# Patient Record
Sex: FEMALE | Race: WHITE | Employment: OTHER | URBAN - METROPOLITAN AREA
[De-identification: names, ages, dates, MRNs, and addresses within clinical notes are randomized per-mention and may not be internally consistent; named-entity substitution may affect disease eponyms.]

---

## 2020-12-18 RX ORDER — SODIUM CHLORIDE 0.9 % (FLUSH) 0.9 %
5-40 SYRINGE (ML) INJECTION AS NEEDED
Status: CANCELLED | OUTPATIENT
Start: 2020-12-18

## 2020-12-18 RX ORDER — SODIUM CHLORIDE 0.9 % (FLUSH) 0.9 %
5-40 SYRINGE (ML) INJECTION EVERY 8 HOURS
Status: CANCELLED | OUTPATIENT
Start: 2020-12-18

## 2020-12-18 RX ORDER — DIPHENHYDRAMINE HYDROCHLORIDE 50 MG/ML
50 INJECTION, SOLUTION INTRAMUSCULAR; INTRAVENOUS ONCE
Status: CANCELLED | OUTPATIENT
Start: 2020-12-18 | End: 2020-12-18

## 2020-12-18 RX ORDER — EPINEPHRINE 0.1 MG/ML
1 INJECTION INTRACARDIAC; INTRAVENOUS
Status: CANCELLED | OUTPATIENT
Start: 2020-12-18 | End: 2020-12-19

## 2020-12-18 RX ORDER — ATROPINE SULFATE 0.1 MG/ML
0.5 INJECTION INTRAVENOUS
Status: CANCELLED | OUTPATIENT
Start: 2020-12-18 | End: 2020-12-19

## 2020-12-18 RX ORDER — DEXTROMETHORPHAN/PSEUDOEPHED 2.5-7.5/.8
1.2 DROPS ORAL
Status: CANCELLED | OUTPATIENT
Start: 2020-12-18

## 2020-12-23 ENCOUNTER — HOSPITAL ENCOUNTER (OUTPATIENT)
Dept: PREADMISSION TESTING | Age: 60
Discharge: HOME | End: 2020-12-23

## 2020-12-23 PROCEDURE — 87635 SARS-COV-2 COVID-19 AMP PRB: CPT

## 2020-12-24 LAB — SARS-COV-2, COV2NT: NOT DETECTED

## 2020-12-28 NOTE — H&P
Assessment/Plan  # Detail Type Description    1. Assessment Abdominal pain (R10.9). Patient Plan Colonoscopy ordered with Dr. Keaton Hermosillo, with Suprep bowel prep. Stressed importance of following all bowel preparation instructions. Explained the procedure to the patient including all risks and benefits. These risks consist of missed lesions on exam, bleeding, and bowel perforation with possible need for admission to the hospital, and in the most extensive of  circumstances, the patient may require surgery. Pt verbalized understanding of these risks and is agreeable with this procedure. Plan Orders Further diagnostic evaluations ordered today include(s) DIAGNOSTIC COLONOSCOPY to be performed. 2. Assessment Change in bowel habit (R19.4). Patient Plan Instructed patient to take Miralax daily to increase water content in stools. Encouraged pt to drink more water, walk as often as possible, eat fresh fruits and vegetables. Pt not to neglect eating cooked vegetables, and smoothies as well. Instructed pt to retrain body to attempt BM every morning, do not hold it, instead try to have a bowel movement when sensation is present. Avoid taking Benefiber unless bowels are moving consistently at regular frequency, as this can add to constipation if taken when already backed up. 3. Assessment Diverticular disease of colon (K57.30). Patient Plan Moderate tortuosity of sigmoid colon  Internal hemorrhoids. Moderate Diverticulosis in sigmoid, hepatic flexure, and cecum. This 56year old  patient was referred by Anthony Rodriguez. This 56year old female presents for Abdominal Pain. History of Present Illness:  1. Abdominal Pain   Onset: 6 months ago. Severity is severe. It occurs daily. The problem is resolved. Location is LLQ. There is no radiation. The patient describes it as sharp. Context: no pattern noted. Denies aggravating factors. Denies relieving factors. Associated symptoms include bloating, constipation, diarrhea and reflux. Pertinent negatives include back pain, blood in stool, change in appetite, diaphoresis, dizziness, dyspnea, eructation, fatigue, fever, flank pain, flatulence, flushing, heartburn, hematuria, jaundice, lightheadedness, menstruation, myalgia, nausea, rash, vaginal bleeding, vaginal discharge, vomiting, weight gain and weight loss. Additional information: BM: 1/day. Pt states the abdominal pain started 10/20 and resolved by 10/31. She is eating less and making healthier food choices, as she is trying to lose weight. Constipation seemed to be related to increase intake of bananas. She takes magnesium supplements which give her diarrhea. She also states she was eating a lot of [place] stew with corn, and may have contributed to recent diverticulitis flare up that she was recently treated with antibiotics for by Dr. Deann Simmons. Pt was referred to GI by Dr. Deann Simmons for the abdominal pain. Pt states, Dr. Deann Simmons told her she may need to go ahead and have her 10 year repeat colonoscopy due to the pain and recent diverticulitis flare.     Last Colonoscopy: 2/14/2011 Dr. Leslie Velasquez @[facility]  Moderate tortuosity of sigmoid, hepatic flexure, and cecum  No polyps, 10 year Recall      PROBLEM LIST:     Problem Description Onset Date Chronic Clinical Status Notes   Pure hypercholesterolemia 11/17/2010 Y     Attention deficit hyperactivity disorder 11/17/2010 Y     Depressive disorder 11/17/2010 Y     Non-toxic multinodular goiter 01/22/2013 Y     Thyroiditis 12/14/2012 Y               PAST MEDICAL/SURGICAL HISTORY   (Detailed)    Disease/disorder Onset Date Management Date Comments   Diverticular disease 2011 2008 Hysterectomy     surgery on left wrist 1990        Arthroscopy knee     Hashimoto's thyroiditis       Depression       Anxiety       Headache, migraine       constipation       Allergic Rhinitis       Insomnia       HLD - Hyperlipidemia OA - Osteoarthritis       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 Preferred language is [place]. The patient does not need an . EDUCATION/EMPLOYMENT/OCCUPATION  Employment History Status Retired Restrictions     own business        own business        MARITAL STATUS/FAMILY/SOCIAL SUPPORT  Marital status:    CHILDREN  Does not have children. Smoking status: Never smoker. TOBACCO SCREENING:  Patient has never used tobacco. Patient has not used tobacco in the last 30 days. Patient has not used smokeless tobacco in the last 30 days. SMOKING STATUS  Type Smoking Status Usage Per Day Years Used Pack Years Total Pack Years    Never smoker         TOBACCO/VAPING EXPOSURE  No passive smoke exposure. ALCOHOL  There is a history of alcohol use. Type: Beer. 2 glasses consumed rarely. CAFFEINE  The patient uses caffeine: soda. LIFESTYLE  Moderate activity level. Health club member. Exercise includes weights. Exercises 3-4 times a week. SLEEP PATTERNS  Patient has no changes to sleep patterns.               Medications (active prior to today)  Medication Name Sig Description Start Date Stop Date Refilled Rx Elsewhere   Multiple Vitamins tablet take 1 tablet by oral route  every day with food 2010   N   Motrin  mg tablet Take 200 mg by mouth every 6 (six) hours as needed for mild pain (PSR 1-3). //   Y   pravastatin 20 mg tablet take 1 tablet by oral route  every day 04/13/2020 04/13/2020 N   Pepcid AC 20 mg tablet take 1 tablet by oral route 2 times every day 07/01/2020 07/01/2020 N   Co Q-10 200 mg capsule  // 12/08/2020  Y   Wellbutrin  mg 24 hr tablet, extended release take 1 tablet by oral route  every day 12/07/2020   N   Celexa 40 mg tablet TAKE ONE TABLET BY MOUTH EVERY DAY 12/07/2020 12/07/2020 N   clobetasol 0.05 % topical cream apply by topical route 2 times every day a thin layer to the affected area(s) for 1 month then once a day for 1 month 12/07/2020 12/07/2020 N   Maxalt-MLT 10 mg disintegrating tablet place 1 tablet by translingual route on top of tongue, allow to dissolve, then swallow once, may repeat every 2 hrs; max 30 mg/24hrs on top of tongue, allow to dissolve then swallow once, may repeat every 2 hrs; max 30 mg/24hrs 12/07/2020 06/07/2021 12/07/2020 N   cyclobenzaprine 10 mg tablet take 1 tablet by oral route up to 2 times every day prn muscle spasms 12/07/2020 03/12/2021 12/07/2020 N   lorazepam 0.5 mg tablet take 0.5 - 1 tablet by ORAL route up to once daily as needed 12/07/2020 03/12/2021 12/07/2020 N     Patient Status   Completed with information received for patient in a summary of care record. Medication Reconciliation  Medications reconciled today. Medication Reviewed  Adherence Medication Name Sig Desc Elsewhere Status   taking as directed Motrin  mg tablet Take 200 mg by mouth every 6 (six) hours as needed for mild pain (PSR 1-3).  Y Verified   taking as directed Multiple Vitamins tablet take 1 tablet by oral route  every day with food N Verified   taking as directed pravastatin 20 mg tablet take 1 tablet by oral route  every day N Verified   taking as directed Pepcid AC 20 mg tablet take 1 tablet by oral route 2 times every day N Verified   taking as directed Wellbutrin  mg 24 hr tablet, extended release take 1 tablet by oral route  every day N Verified   taking as directed Celexa 40 mg tablet TAKE ONE TABLET BY MOUTH EVERY DAY N Verified   taking as directed clobetasol 0.05 % topical cream apply by topical route 2 times every day a thin layer to the affected area(s) for 1 month then once a day for 1 month N Verified   taking as directed Maxalt-MLT 10 mg disintegrating tablet place 1 tablet by translingual route on top of tongue, allow to dissolve, then swallow once, may repeat every 2 hrs; max 30 mg/24hrs on top of tongue, allow to dissolve then swallow once, may repeat every 2 hrs; max 30 mg/24hrs N Verified   taking as directed cyclobenzaprine 10 mg tablet take 1 tablet by oral route up to 2 times every day prn muscle spasms N Verified   taking as directed lorazepam 0.5 mg tablet take 0.5 - 1 tablet by ORAL route up to once daily as needed N Verified   taking as directed Suprep Bowel Prep Kit 17.5 gram-3.13 gram-1.6 gram oral solution Take as directed.  N Verified     Medications (Added, Continued or Stopped today)  Start Date Medication Directions PRN Status PRN Reason Instruction Stop Date   12/07/2020 Celexa 40 mg tablet TAKE ONE TABLET BY MOUTH EVERY DAY N      12/07/2020 clobetasol 0.05 % topical cream apply by topical route 2 times every day a thin layer to the affected area(s) for 1 month then once a day for 1 month N       Co Q-10 200 mg capsule  N   12/08/2020 12/07/2020 cyclobenzaprine 10 mg tablet take 1 tablet by oral route up to 2 times every day prn muscle spasms N   03/12/2021 12/07/2020 lorazepam 0.5 mg tablet take 0.5 - 1 tablet by ORAL route up to once daily as needed N   03/12/2021 12/07/2020 Maxalt-MLT 10 mg disintegrating tablet place 1 tablet by translingual route on top of tongue, allow to dissolve, then swallow once, may repeat every 2 hrs; max 30 mg/24hrs on top of tongue, allow to dissolve then swallow once, may repeat every 2 hrs; max 30 mg/24hrs N   06/07/2021    Motrin  mg tablet Take 200 mg by mouth every 6 (six) hours as needed for mild pain (PSR 1-3). N      01/22/2010 Multiple Vitamins tablet take 1 tablet by oral route  every day with food N      07/01/2020 Pepcid AC 20 mg tablet take 1 tablet by oral route 2 times every day N      04/13/2020 pravastatin 20 mg tablet take 1 tablet by oral route  every day N      12/08/2020 Suprep Bowel Prep Kit 17.5 gram-3.13 gram-1.6 gram oral solution Take as directed. N      12/07/2020 Wellbutrin  mg 24 hr tablet, extended release take 1 tablet by oral route  every day N        Allergies:  Ingredient Reaction (Severity) Medication Name Comment   ATORVASTATIN HA     SULFA (SULFONAMIDE ANTIBIOTICS)      SULFAMETHOXAZOLE   BACTRIM   TRIMETHOPRIM   BACTRIM   Reviewed, no changes. ORDERS:  Status Lab Order Time Frame Comments   ordered DIAGNOSTIC COLONOSCOPY       Review of System  System Neg/Pos Details   Constitutional Negative Fatigue, Fever, Flushing, Weight gain and Weight loss. ENMT Negative Sinus Infection. Eyes Negative Double vision. Respiratory Negative Asthma, Chronic cough and Dyspnea. Cardio Negative Chest pain, Edema and Irregular heartbeat/palpitations. GI Positive Abdominal pain, Bloating, Change in bowel habits, Constipation, Diarrhea, Reflux. GI Negative Blood in stool, Change in appetite, Change in stool caliber, Decreased appetite, Dysphagia, Eructation, Flatulence, Heartburn, Hematemesis, Hematochezia, Jaundice, Melena, Nausea, Rectal bleeding and Vomiting.  Negative Back pain, Dysuria, Flank pain, Hematuria, Menstruation and Vaginal bleeding. Endocrine Negative Cold intolerance, Diaphoresis and Heat intolerance. Neuro Negative Dizziness, Headache, Lightheadedness, Numbness and Tremors. Psych Negative Anxiety, Depression and Increased stress. Integumentary Negative Hives, Pruritus and Rash. MS Negative Back pain, Joint pain and Myalgia. Hema/Lymph Negative Easy bleeding, Easy bruising and Lymphadenopathy.    Allergic/Immuno Negative Food allergies and Immunosuppression. Reproductive Negative Vaginal discharge. Vital Signs   Height  Time ft in cm Last Measured Height Position   9:44 AM 5.0 2.00 157.48 12/08/2020 Standing     Date/Time Temp Pulse BP MAP (Calculated) Arterial Line 1 BP (mmHg) BP Patient Position Resp SpO2 O2 Device O2 Flow Rate (L/min) Pre/Post Ductal Weight   12/29/20 0759 98.5 °F (36.9 °C) 92 119/76 90   18 98 % Room air   83.7 kg (184 lb 8 oz)     PHYSICAL EXAM:  Exam Findings Details   Constitutional Normal Well developed. Eyes Normal Conjunctiva - Right: Normal, Left: Normal. Sclera - Right: Normal, Left: Normal.   Nasopharynx Normal Lips/teeth/gums - Normal.   Neck Exam Normal Inspection - Normal.   Respiratory Normal Inspection - Normal.   Cardiovascular Normal Regular rate and rhythm. No murmurs, gallops, or rubs. Vascular Normal Pulses - Brachial: Normal.   Abdomen * Obese. Abdomen Normal Inspection - Normal. Appliance(s) - None. Auscultation - Normal. Percussion - Normal. Anterior palpation - No guarding. No abdominal tenderness. No hepatic enlargement. No hernia. No ascites. Skin Normal Inspection - Normal.   Musculoskeletal Normal Hands/Wrist - Right: Normal, Left: Normal.   Extremity Normal No edema. Neurological Normal Fine motor skills - Normal.   Psychiatric Normal Orientation - Oriented to time, place, person & situation. Appropriate mood and affect.      No change in H&P

## 2020-12-29 ENCOUNTER — HOSPITAL ENCOUNTER (OUTPATIENT)
Age: 60
Setting detail: OUTPATIENT SURGERY
Discharge: HOME | End: 2020-12-29
Attending: INTERNAL MEDICINE | Admitting: INTERNAL MEDICINE
Payer: COMMERCIAL

## 2020-12-29 VITALS
HEART RATE: 68 BPM | BODY MASS INDEX: 33.95 KG/M2 | TEMPERATURE: 98 F | HEIGHT: 62 IN | SYSTOLIC BLOOD PRESSURE: 104 MMHG | RESPIRATION RATE: 16 BRPM | DIASTOLIC BLOOD PRESSURE: 64 MMHG | OXYGEN SATURATION: 98 % | WEIGHT: 184.5 LBS

## 2020-12-29 PROCEDURE — 74011250636 HC RX REV CODE- 250/636: Performed by: INTERNAL MEDICINE

## 2020-12-29 PROCEDURE — 77030040361 HC SLV COMPR DVT MDII -B: Performed by: INTERNAL MEDICINE

## 2020-12-29 PROCEDURE — 2709999900 HC NON-CHARGEABLE SUPPLY: Performed by: INTERNAL MEDICINE

## 2020-12-29 PROCEDURE — 76040000008: Performed by: INTERNAL MEDICINE

## 2020-12-29 PROCEDURE — 77030039961 HC KT CUST COLON BSC -D: Performed by: INTERNAL MEDICINE

## 2020-12-29 PROCEDURE — G0500 MOD SEDAT ENDO SERVICE >5YRS: HCPCS | Performed by: INTERNAL MEDICINE

## 2020-12-29 PROCEDURE — 99153 MOD SED SAME PHYS/QHP EA: CPT | Performed by: INTERNAL MEDICINE

## 2020-12-29 RX ORDER — FAMOTIDINE 20 MG/1
20 TABLET, FILM COATED ORAL 2 TIMES DAILY
COMMUNITY

## 2020-12-29 RX ORDER — SODIUM CHLORIDE 9 MG/ML
1000 INJECTION, SOLUTION INTRAVENOUS CONTINUOUS
Status: DISCONTINUED | OUTPATIENT
Start: 2020-12-29 | End: 2020-12-29 | Stop reason: HOSPADM

## 2020-12-29 RX ORDER — FENTANYL CITRATE 50 UG/ML
100 INJECTION, SOLUTION INTRAMUSCULAR; INTRAVENOUS
Status: DISCONTINUED | OUTPATIENT
Start: 2020-12-29 | End: 2020-12-29 | Stop reason: HOSPADM

## 2020-12-29 RX ORDER — MIDAZOLAM HYDROCHLORIDE 1 MG/ML
.25-5 INJECTION, SOLUTION INTRAMUSCULAR; INTRAVENOUS
Status: DISCONTINUED | OUTPATIENT
Start: 2020-12-29 | End: 2020-12-29 | Stop reason: HOSPADM

## 2020-12-29 RX ORDER — LORAZEPAM 1 MG/1
TABLET ORAL
COMMUNITY

## 2020-12-29 RX ORDER — NALOXONE HYDROCHLORIDE 0.4 MG/ML
0.4 INJECTION, SOLUTION INTRAMUSCULAR; INTRAVENOUS; SUBCUTANEOUS
Status: DISCONTINUED | OUTPATIENT
Start: 2020-12-29 | End: 2020-12-29 | Stop reason: HOSPADM

## 2020-12-29 RX ORDER — CITALOPRAM 40 MG/1
40 TABLET, FILM COATED ORAL DAILY
COMMUNITY

## 2020-12-29 RX ORDER — PRAVASTATIN SODIUM 20 MG/1
20 TABLET ORAL
COMMUNITY

## 2020-12-29 RX ORDER — FLUMAZENIL 0.1 MG/ML
0.2 INJECTION INTRAVENOUS
Status: DISCONTINUED | OUTPATIENT
Start: 2020-12-29 | End: 2020-12-29 | Stop reason: HOSPADM

## 2020-12-29 RX ADMIN — SODIUM CHLORIDE 1000 ML: 900 INJECTION, SOLUTION INTRAVENOUS at 08:07

## 2020-12-29 NOTE — PROCEDURES
[facility]  Colonoscopy Procedure Report  _______________________________________________________  Patient: Carine Varela                                        Attending Physician: Xavier Benitez MD    Patient ID: [id number]                                    Referring Physician: Bhargavi Barger    Exam Date: 12/29/2020      Introduction: A  56 y.o. female patient, presents for outpatient Colonoscopy    Indications:   Had an episode of severe daily LLQ pain daily in 10 days in October and resolved. Location is LLQ. Attributed to diverticulitis. Associated symptoms include bloating, constipation, diarrhea and reflux. Pertinent negatives include back pain, blood in stool, change in appetite, diaphoresis, dizziness, dyspnea, eructation, fatigue, fever, flank pain, flatulence, flushing, heartburn, hematuria, jaundice, lightheadedness, menstruation, myalgia, nausea, rash, vaginal bleeding, vaginal discharge, vomiting, weight gain and weight loss. Additional information: BM: 1/ 2 days. Constipation seemed to be related to increase intake of bananas. She takes magnesium supplements which give her diarrhea. She was recently treated with antibiotics for by Dr. Jah Heaton for the diverticulitis. Dr. Jah Heaton told her she may need to go ahead and have her 10 year repeat colonoscopy due to the pain and recent diverticulitis flare. Last Colonoscopy: 2/14/2011 Dr. Keaton Hermosillo @[facility]. Moderate tortuosity of sigmoid, hepatic flexure, and cecum. No polyps, Had hysterectomy in 2008. No Fx Hx of colon cancer. Mother had breast cancer. Consent: The benefits, risks, and alternatives to the procedure were discussed and informed consent was obtained from the patient. Preparation: EKG, pulse, pulse oximetry and blood pressure were monitored throughout the procedure. ASA Classification: Class I- . The heart is an S1-S2 and regular heart rate and rhythm. Lungs are clear to auscultation and percussion.  Abdomen is soft, nondistended, and nontender. Mental Status: awake, alert, and oriented to person, place, and time    Medications:  · Fentanyl 200 mcg IV and Versed 7 mg IV throughout the procedure. Rectal Exam: Normal Rectal Exam. No Blood. Pathology Specimens:  0    Procedure: The colonoscope was passed with great difficulty through the anus under direct visualization and advanced to the cecum and 5 cm inside the terminal ileum. Retroflexion is made in the ascending colon. The patient required positioning on the back to aid in the passage of the scope. The scope was withdrawn and the mucosa was carefully examined. The quality of the preparation was excellent. The views were excellent. The patient's toleration of the procedure was excellent. The exam was done twice to the cecum. Total time is 34 minutes and withdrawal time is 17 minutes. Findings:    Rectum:   Small internal hemorrhoids. Sigmoid:   Very tortuous sigmoid colon and the whole colon with some fixed adhesions. Moderate diverticulosis of the sigmoid. No diverticulitis   Descending Colon: Moderate diverticulosis in the desending colon. Transverse Colon: Moderate diverticulosis in the transverse colon. Ascending Colon:   Normal   Cecum:   Normal but located in the naval area suggestive of colonic malrotation to explain the very tortuous and difficult colonoscopy. Terminal Ileum:   Normal      Unplanned Events: There were no unplanned events. Estimated Blood Loss: None  Impressions: Small internal hemorrhoids. Very difficult colonoscopy, Very tortuous sigmoid colon and the whole colon with some fixed adhesions? Moderate diverticulosis in the left colon and the distal transverse colon. Normal Mucosa. No blood, polyps or AVM found. Complications: None; patient tolerated the procedure well. Recommendations:  · Discharge home when standard parameters are met. · Resume a high fiber diet. · Resume own medications.  It is better to avoid all NSAID's   · Colonoscopy recommendation in 10 years. · Avoid being constipated by taking Miralax and/ or Colace 100 mg twice daily on regular basis.     Procedure Codes:    · COLONOSCOPY [[id number] (Type: Custom)]    Endoscope Information:  Model Number(s)    [id number]   Assistant: None  Signed By: Marylen Ax, MD Date: 12/29/2020

## 2020-12-29 NOTE — DISCHARGE INSTRUCTIONS
Zion [place]  [id number]  1960    COLON DISCHARGE INSTRUCTIONS    Discomfort:  Redness at IV site- apply warm compress to area; if redness or soreness persist- contact your physician  There may be a slight amount of blood passed from the rectum  Gaseous discomfort- walking, belching will help relieve any discomfort  You may not operate a vehicle til the next day. You may not engage in an occupation involving machinery or appliances til the next day. You may not drink alcoholic beverages til the next day. DIET:   High fiber diet. ACTIVITY:  You may not  resume your normal daily activities til the next day. it is recommended that you spend the remainder of the day resting -  avoid any strenuous activity. CALL M.D.  IF ANY SIGN OF:   Increasing pain, nausea, vomiting  Abdominal distension (swelling)  New increased bleeding (oral or rectal)  Fever (chills)  Pain in chest area  Bloody discharge from nose or mouth  Shortness of breath    You may  take any Advil, Aspirin, Ibuprofen, Motrin, Aleve, or Goodys but it is better to take ONLY  Tylenol as needed for pain. Post procedure diagnosis:  TORTUOUS SIGMOID; SIGMOID DIVERTICULOSIS; MALROTATION OF THE COLON; INTERNAL HEMRRHOIDS; Follow-up Instructions: Your follow up colonoscopy will be in 10 years. Jay Linda MD  December 29, 2020     DISCHARGE SUMMARY from Nurse    PATIENT INSTRUCTIONS:    After general anesthesia or intravenous sedation, for 24 hours or while taking prescription Narcotics:  · Limit your activities  · Do not drive and operate hazardous machinery  · Do not make important personal or business decisions  · Do  not drink alcoholic beverages  · If you have not urinated within 8 hours after discharge, please contact your surgeon on call.     Report the following to your surgeon:  · Excessive pain, swelling, redness or odor of or around the surgical area  · Temperature over 100.5  · Nausea and vomiting lasting longer than 4 hours or if unable to take medications  · Any signs of decreased circulation or nerve impairment to extremity: change in color, persistent  numbness, tingling, coldness or increase pain  · Any questions    What to do at Home:  Recommended activity: AS ABOVE,     If you experience any of the following symptoms AS ABOVE, please follow up with DR. HARRISON. *  Please give a list of your current medications to your Primary Care Provider. *  Please update this list whenever your medications are discontinued, doses are      changed, or new medications (including over-the-counter products) are added. *  Please carry medication information at all times in case of emergency situations. These are general instructions for a healthy lifestyle:    No smoking/ No tobacco products/ Avoid exposure to second hand smoke  Surgeon General's Warning:  Quitting smoking now greatly reduces serious risk to your health. Obesity, smoking, and sedentary lifestyle greatly increases your risk for illness    A healthy diet, regular physical exercise & weight monitoring are important for maintaining a healthy lifestyle    You may be retaining fluid if you have a history of heart failure or if you experience any of the following symptoms:  Weight gain of 3 pounds or more overnight or 5 pounds in a week, increased swelling in our hands or feet or shortness of breath while lying flat in bed. Please call your doctor as soon as you notice any of these symptoms; do not wait until your next office visit. The discharge information has been reviewed with the patient. The patient verbalized understanding. Discharge medications reviewed with the patient and appropriate educational materials and side effects teaching were provided.   ___________________________________________________________________________________________________________________________________  Patient armband removed and shredded

## 2021-02-26 ENCOUNTER — OFFICE VISIT (OUTPATIENT)
Dept: ORTHOPEDIC SURGERY | Age: 61
End: 2021-02-26
Payer: COMMERCIAL

## 2021-02-26 VITALS — BODY MASS INDEX: 34.96 KG/M2 | WEIGHT: 190 LBS | HEIGHT: 62 IN

## 2021-02-26 DIAGNOSIS — M25.562 CHRONIC PAIN OF LEFT KNEE: ICD-10-CM

## 2021-02-26 DIAGNOSIS — M17.12 PRIMARY OSTEOARTHRITIS OF LEFT KNEE: Primary | ICD-10-CM

## 2021-02-26 DIAGNOSIS — G89.29 CHRONIC PAIN OF LEFT KNEE: ICD-10-CM

## 2021-02-26 DIAGNOSIS — M17.12 PRIMARY OSTEOARTHRITIS OF LEFT KNEE: ICD-10-CM

## 2021-02-26 DIAGNOSIS — M17.11 PRIMARY OSTEOARTHRITIS OF RIGHT KNEE: ICD-10-CM

## 2021-02-26 PROCEDURE — 99203 OFFICE O/P NEW LOW 30 MIN: CPT | Performed by: ORTHOPAEDIC SURGERY

## 2021-02-26 NOTE — PATIENT INSTRUCTIONS
Knee Arthritis: Care Instructions Your Care Instructions Knee arthritis is a breakdown of the cartilage that cushions your knee joint. When the cartilage wears down, your bones rub against each other. This causes pain and stiffness. Knee arthritis tends to get worse with time. Treatment for knee arthritis involves reducing pain, making the leg muscles stronger, and staying at a healthy body weight. The treatment usually does not improve the health of the cartilage, but it can reduce pain and improve how well your knee works. You can take simple measures to protect your knee joints, ease your pain, and help you stay active. Follow-up care is a key part of your treatment and safety. Be sure to make and go to all appointments, and call your doctor if you are having problems. It's also a good idea to know your test results and keep a list of the medicines you take. How can you care for yourself at home? · Know that knee arthritis will cause more pain on some days than on others. · Stay at a healthy weight. Lose weight if you are overweight. When you stand up, the pressure on your knees from every pound of body weight is multiplied four times. So if you lose 10 pounds, you will reduce the pressure on your knees by 40 pounds. · Talk to your doctor or physical therapist about exercises that will help ease joint pain. ? Stretch to help prevent stiffness and to prevent injury before you exercise. You may enjoy gentle forms of yoga to help keep your knee joints and muscles flexible. ? Walk instead of jog. 
? Ride a bike. This makes your thigh muscles stronger and takes pressure off your knee. ? Wear well-fitting and comfortable shoes. ? Exercise in chest-deep water. This can help you exercise longer with less pain. ? Avoid exercises that include squatting or kneeling. They can put a lot of strain on your knees. ? Talk to your doctor to make sure that the exercise you do is not making the arthritis worse. 
· Do not sit for long periods of time. Try to walk once in a while to keep your knee from getting stiff. 
· Ask your doctor or physical therapist whether shoe inserts may reduce your arthritis pain. 
· If you can afford it, get new athletic shoes at least every year. This can help reduce the strain on your knees. 
· Use a device to help you do everyday activities. 
? A cane or walking stick can help you keep your balance when you walk. Hold the cane or walking stick in the hand opposite the painful knee. 
? If you feel like you may fall when you walk, try using crutches or a front-wheeled walker. These can prevent falls that could cause more damage to your knee. 
? A knee brace may help keep your knee stable and prevent pain. 
? You also can use other things to make life easier, such as a higher toilet seat and handrails in the bathtub or shower. 
· Take pain medicines exactly as directed. 
? Do not wait until you are in severe pain. You will get better results if you take it sooner. 
? If you are not taking a prescription pain medicine, take an over-the-counter medicine such as acetaminophen (Tylenol), ibuprofen (Advil, Motrin), or naproxen (Aleve). Read and follow all instructions on the label. 
? Do not take two or more pain medicines at the same time unless the doctor told you to. Many pain medicines have acetaminophen, which is Tylenol. Too much acetaminophen (Tylenol) can be harmful. 
? Tell your doctor if you take a blood thinner, have diabetes, or have allergies to shellfish. 
· Ask your doctor if you might benefit from a shot of steroid medicine into your knee. This may provide pain relief for several months. 
 · Many people take the supplements glucosamine and chondroitin for osteoarthritis. Some people feel they help, but the medical research does not show that they work. Talk to your doctor before you take these supplements. When should you call for help? Call your doctor now or seek immediate medical care if: 
  · You have sudden swelling, warmth, or pain in your knee.  
  · You have knee pain and a fever or rash.  
  · You have such bad pain that you cannot use your knee. Watch closely for changes in your health, and be sure to contact your doctor if you have any problems. Where can you learn more? Go to http://www.gray.com/ Enter V315 in the search box to learn more about \"Knee Arthritis: Care Instructions. \" Current as of: December 9, 2019               Content Version: 12.6 © 9689-3264 [employer]. Care instructions adapted under license by [employer] (which disclaims liability or warranty for this information). If you have questions about a medical condition or this instruction, always ask your healthcare professional. [address] any warranty or liability for your use of this information.

## 2021-02-26 NOTE — PROGRESS NOTES
Name: Stephanie Lopez    : 1960     Service Dept: [address] and [facility] Medicine    Patient's Pharmacies:  No Pharmacies Listed     Chief Complaint   Patient presents with    Knee Pain        Visit Vitals  Ht 5' 2\" (1.575 m)   Wt 190 lb (86.2 kg)   BMI 34.75 kg/m²      Allergies   Allergen Reactions    Sulfa (Sulfonamide Antibiotics) Rash      Current Outpatient Medications   Medication Sig Dispense Refill    citalopram (CeleXA) 40 mg tablet Take 40 mg by mouth daily.  pravastatin (PRAVACHOL) 20 mg tablet Take 20 mg by mouth nightly.  famotidine (Pepcid) 20 mg tablet Take 20 mg by mouth two (2) times a day.  LORazepam (Ativan) 1 mg tablet Take  by mouth every four (4) hours as needed for Anxiety. There is no problem list on file for this patient. Family History   Problem Relation Age of Onset    Cancer Mother     Diabetes Mother     Heart Disease Father     Cancer Brother     Heart Disease Brother       Social History     Socioeconomic History    Marital status:      Spouse name: Not on file    Number of children: Not on file    Years of education: Not on file    Highest education level: Not on file   Tobacco Use    Smoking status: Never Smoker    Smokeless tobacco: Never Used   Substance and Sexual Activity    Alcohol use:  Yes     Alcohol/week: 1.0 standard drinks     Types: 1 Glasses of wine per week    Drug use: Never      Past Surgical History:   Procedure Laterality Date    COLONOSCOPY N/A 2020    COLONOSCOPY performed by Thiago Chavez MD at [facility] ENDOSCOPY    HX HYSTERECTOMY      HX ORTHOPAEDIC      left wrist; torn miniscus    HX OTHER SURGICAL      corrections of zenkers diverticulum      Past Medical History:   Diagnosis Date    Arthritis     GERD (gastroesophageal reflux disease)     Nausea & vomiting     with general andesthesia not con-sed    Zenker's diverticulum         I have reviewed and agree with [address] [facility] [address] and ROS and intake form in chart and the record furthermore I have reviewed prior medical record(s) regarding this patients care during this appointment. Review of Systems:   Patient is a pleasant appearing individual, appropriately dressed, well hydrated, well nourished, who is alert, appropriately oriented for age, and in no acute distress with a normal gait and normal affect who does not appear to be in any significant pain. Physical Exam:  Left Knee -Decrease range of motion with flexion, Knee arc of greater than 50 degrees, Some crepitation, Grossly neurovascularly intact, Good cap refill, No skin lesion, Moderate swelling, No gross instability, Some quadriceps weakness Kellgren and Bartolo at least grade 3    Right Knee -Decrease range of motion with flexion, Some crepitation, Grossly neurovascularly intact, Good cap refill, No skin lesion, Moderate swelling, No gross instability, Some quadriceps weaknessKellgren and Bartolo at least grade 3     Encounter Diagnoses     ICD-10-CM ICD-9-CM   1. Primary osteoarthritis of left knee  M17.12 715.16   2. Primary osteoarthritis of right knee  M17.11 715.16   3. Chronic pain of left knee  M25.562 719.46    G89.29 338.29       HPI:  The patient is here with a chief complaint of bilateral knee pain, right greater than left, progressively getting worse. Nothing has helped. Pain is 6/10. ROS:  10-point review of systems is positive for nighttime pain, instability and insomnia. X-rays are positive for severe osteoarthritis (OA) of the left knee. Assessment/Plan:  Plan will be for left total knee replacement, general medical clearance. Because of her insurance constraints, we may look at different options of surgical locations and go from there. As part of continued conservative pain management options the patient was advised to utilize Tylenol or OTC NSAIDS as long as it is not medically contraindicated.      Return to Office: Follow-up and Dispositions    · Return for [facility] for surgery. Scribed by Ivelisse Storey MD as dictated by Aguilar Figueredo. Neil Collazo MD.  Documentation True and Accepted Nathen Collazo MD

## 2021-04-23 ENCOUNTER — TELEPHONE (OUTPATIENT)
Dept: ORTHOPEDIC SURGERY | Age: 61
End: 2021-04-23

## 2021-04-23 NOTE — TELEPHONE ENCOUNTER
Patient is scheduled for 6/18 TKA. She would like a brace for her knee.  She stated that it gives out on her

## 2021-04-30 ENCOUNTER — OFFICE VISIT (OUTPATIENT)
Dept: ORTHOPEDIC SURGERY | Age: 61
End: 2021-04-30
Payer: COMMERCIAL

## 2021-04-30 DIAGNOSIS — M17.11 OSTEOARTHRITIS OF RIGHT KNEE, UNSPECIFIED OSTEOARTHRITIS TYPE: Primary | ICD-10-CM

## 2021-04-30 DIAGNOSIS — M17.12 OSTEOARTHRITIS OF LEFT KNEE, UNSPECIFIED OSTEOARTHRITIS TYPE: ICD-10-CM

## 2021-04-30 PROCEDURE — 99213 OFFICE O/P EST LOW 20 MIN: CPT | Performed by: ORTHOPAEDIC SURGERY

## 2021-04-30 RX ORDER — CYCLOBENZAPRINE HCL 10 MG
TABLET ORAL
COMMUNITY
Start: 2021-02-15

## 2021-04-30 RX ORDER — DICLOFENAC SODIUM 75 MG/1
TABLET, DELAYED RELEASE ORAL
COMMUNITY
Start: 2021-03-22

## 2021-04-30 RX ORDER — AMOXICILLIN AND CLAVULANATE POTASSIUM 875; 125 MG/1; MG/1
TABLET, FILM COATED ORAL
COMMUNITY
Start: 2021-01-26

## 2021-04-30 NOTE — PROGRESS NOTES
Name: Chely López    : 1960     Service Dept: [address] and Sports Medicine    Patient's Pharmacies:  No Pharmacies Listed     Chief Complaint   Patient presents with    Knee Pain        There were no vitals taken for this visit. Allergies   Allergen Reactions    Sulfa (Sulfonamide Antibiotics) Rash      Current Outpatient Medications   Medication Sig Dispense Refill    amoxicillin-clavulanate (AUGMENTIN) 875-125 mg per tablet TAKE ONE TABLET BY MOUTH EVERY TWELVE HOURS      cyclobenzaprine (FLEXERIL) 10 mg tablet TAKE ONE TABLET BY MOUTH UP TO 2 TIMES EVERY DAY AS NEEDED FOR MUSCLE SPASMS.  diclofenac EC (VOLTAREN) 75 mg EC tablet TAKE ONE TABLET BY MOUTH TWICE A DAY WITH FOOD      citalopram (CeleXA) 40 mg tablet Take 40 mg by mouth daily.  pravastatin (PRAVACHOL) 20 mg tablet Take 20 mg by mouth nightly.  famotidine (Pepcid) 20 mg tablet Take 20 mg by mouth two (2) times a day.  LORazepam (Ativan) 1 mg tablet Take  by mouth every four (4) hours as needed for Anxiety. There is no problem list on file for this patient. Family History   Problem Relation Age of Onset    Cancer Mother     Diabetes Mother     Heart Disease Father     Cancer Brother     Heart Disease Brother       Social History     Socioeconomic History    Marital status:      Spouse name: Not on file    Number of children: Not on file    Years of education: Not on file    Highest education level: Not on file   Tobacco Use    Smoking status: Never Smoker    Smokeless tobacco: Never Used   Substance and Sexual Activity    Alcohol use:  Yes     Alcohol/week: 1.0 standard drinks     Types: 1 Glasses of wine per week    Drug use: Never      Past Surgical History:   Procedure Laterality Date    COLONOSCOPY N/A 2020    COLONOSCOPY performed by Sarai Cervantes MD at [address] [place] [address] [address]      left wrist; torn miniscus  HX OTHER SURGICAL      corrections of zenkers diverticulum      Past Medical History:   Diagnosis Date    Arthritis     GERD (gastroesophageal reflux disease)     Nausea & vomiting     with general andesthesia not con-sed    Zenker's diverticulum         I have reviewed and agree with PFSH and ROS and intake form in chart and the record furthermore I have reviewed prior medical record(s) regarding this patients care during this appointment. Review of Systems:   Patient is a pleasant appearing individual, appropriately dressed, well hydrated, well nourished, who is alert, appropriately oriented for age, and in no acute distress with a normal gait and normal affect who does not appear to be in any significant pain. Physical Exam:  Left Knee -Decrease range of motion with flexion, Knee arc of greater than 50 degrees, Some crepitation, Grossly neurovascularly intact, Good cap refill, No skin lesion, Moderate swelling, No gross instability, Some quadriceps weakness Kellgren and Bartolo at least grade 3    Right Knee -Decrease range of motion with flexion, Some crepitation, Grossly neurovascularly intact, Good cap refill, No skin lesion, Moderate swelling, No gross instability, Some quadriceps weaknessKellgren and Bartolo at least grade 3   Encounter Diagnoses     ICD-10-CM ICD-9-CM   1. Osteoarthritis of right knee, unspecified osteoarthritis type  M17.11 715.96   2. Osteoarthritis of left knee, unspecified osteoarthritis type  M17.12 715.96       HPI:  The patient is here with a chief complaint of bilateral knee pain, throbbing burning pain, progressively getting worse. Pain is 5/10. X-rays are positive for OA. Assessment/Plan:  Plan would be for quad PT, activities as tolerated, weightbearing started, no restrictions. We will see the patient back as needed and go from there.       As part of continued conservative pain management options the patient was advised to utilize Tylenol or OTC NSAIDS as long as it is not medically contraindicated. Return to Office: Follow-up and Dispositions    · Return if symptoms worsen or fail to improve. Scribed by Christie Hughes LPN as dictated by [facility] BEKA Galvan MD.  Documentation True and Accepted [facility] BEKA Galvan MD

## 2021-05-20 ENCOUNTER — HOSPITAL ENCOUNTER (OUTPATIENT)
Dept: GENERAL RADIOLOGY | Age: 61
Discharge: HOME | End: 2021-05-20
Payer: COMMERCIAL

## 2021-05-20 ENCOUNTER — TRANSCRIBE ORDER (OUTPATIENT)
Dept: REGISTRATION | Age: 61
End: 2021-05-20

## 2021-05-20 ENCOUNTER — HOSPITAL ENCOUNTER (OUTPATIENT)
Dept: NON INVASIVE DIAGNOSTICS | Age: 61
Discharge: HOME | End: 2021-05-20
Payer: COMMERCIAL

## 2021-05-20 ENCOUNTER — HOSPITAL ENCOUNTER (OUTPATIENT)
Dept: LAB | Age: 61
Discharge: HOME | End: 2021-05-20
Payer: COMMERCIAL

## 2021-05-20 DIAGNOSIS — M17.12 DEGENERATIVE ARTHRITIS OF LEFT KNEE: ICD-10-CM

## 2021-05-20 DIAGNOSIS — G89.29 CHRONIC PAIN OF LEFT KNEE: ICD-10-CM

## 2021-05-20 DIAGNOSIS — M17.12 PRIMARY OSTEOARTHRITIS OF LEFT KNEE: ICD-10-CM

## 2021-05-20 DIAGNOSIS — M25.562 CHRONIC PAIN OF LEFT KNEE: ICD-10-CM

## 2021-05-20 DIAGNOSIS — M17.12 DEGENERATIVE ARTHRITIS OF LEFT KNEE: Primary | ICD-10-CM

## 2021-05-20 LAB
ATRIAL RATE: 68 BPM
CALCULATED P AXIS, ECG09: 57 DEGREES
CALCULATED R AXIS, ECG10: 34 DEGREES
CALCULATED T AXIS, ECG11: 49 DEGREES
DIAGNOSIS, 93000: NORMAL
P-R INTERVAL, ECG05: 146 MS
Q-T INTERVAL, ECG07: 424 MS
QRS DURATION, ECG06: 84 MS
QTC CALCULATION (BEZET), ECG08: 450 MS
VENTRICULAR RATE, ECG03: 68 BPM

## 2021-05-20 PROCEDURE — 71046 X-RAY EXAM CHEST 2 VIEWS: CPT

## 2021-05-20 PROCEDURE — 93005 ELECTROCARDIOGRAM TRACING: CPT

## 2021-05-21 LAB
BACTERIA SPEC CULT: NORMAL
BACTERIA SPEC CULT: NORMAL
SERVICE CMNT-IMP: NORMAL

## 2021-06-02 DIAGNOSIS — M17.12 OSTEOARTHRITIS OF LEFT KNEE, UNSPECIFIED OSTEOARTHRITIS TYPE: Primary | ICD-10-CM

## 2021-06-11 ENCOUNTER — OFFICE VISIT (OUTPATIENT)
Dept: ORTHOPEDIC SURGERY | Age: 61
End: 2021-06-11
Payer: COMMERCIAL

## 2021-06-11 DIAGNOSIS — M25.562 LEFT KNEE PAIN, UNSPECIFIED CHRONICITY: Primary | ICD-10-CM

## 2021-06-11 PROCEDURE — 99214 OFFICE O/P EST MOD 30 MIN: CPT | Performed by: ORTHOPAEDIC SURGERY

## 2021-06-11 RX ORDER — ONDANSETRON 4 MG/1
4 TABLET, ORALLY DISINTEGRATING ORAL
Qty: 10 TABLET | Refills: 0 | Status: SHIPPED | OUTPATIENT
Start: 2021-06-11

## 2021-06-11 RX ORDER — CEPHALEXIN 500 MG/1
500 CAPSULE ORAL EVERY 8 HOURS
Qty: 3 CAPSULE | Refills: 0 | Status: SHIPPED | OUTPATIENT
Start: 2021-06-11 | End: 2021-06-12

## 2021-06-11 RX ORDER — OXYCODONE AND ACETAMINOPHEN 5; 325 MG/1; MG/1
1 TABLET ORAL
Qty: 30 TABLET | Refills: 0 | Status: SHIPPED | OUTPATIENT
Start: 2021-06-11 | End: 2021-06-25

## 2021-06-11 NOTE — PROGRESS NOTES
Name: Chely López    : 1960     Service Dept: [address] and Sports Medicine    Patient's Pharmacies:    [address] [place], [place] [id number] Michelle Mcarthur  [address] [address]  Phone: [phone] Fax: [phone]       Chief Complaint   Patient presents with    Knee Pain        There were no vitals taken for this visit. Allergies   Allergen Reactions    Atorvastatin Other (comments)    Ciprofloxacin Other (comments)    Metronidazole Other (comments)    Sulfa (Sulfonamide Antibiotics) Rash    Trimethoprim Other (comments)      Current Outpatient Medications   Medication Sig Dispense Refill    amoxicillin-clavulanate (AUGMENTIN) 875-125 mg per tablet TAKE ONE TABLET BY MOUTH EVERY TWELVE HOURS      cyclobenzaprine (FLEXERIL) 10 mg tablet TAKE ONE TABLET BY MOUTH UP TO 2 TIMES EVERY DAY AS NEEDED FOR MUSCLE SPASMS.  diclofenac EC (VOLTAREN) 75 mg EC tablet TAKE ONE TABLET BY MOUTH TWICE A DAY WITH FOOD      citalopram (CeleXA) 40 mg tablet Take 40 mg by mouth daily.  pravastatin (PRAVACHOL) 20 mg tablet Take 20 mg by mouth nightly.  famotidine (Pepcid) 20 mg tablet Take 20 mg by mouth two (2) times a day.  LORazepam (Ativan) 1 mg tablet Take  by mouth every four (4) hours as needed for Anxiety. There is no problem list on file for this patient. Family History   Problem Relation Age of Onset    Cancer Mother     Diabetes Mother     Heart Disease Father     Cancer Brother     Heart Disease Brother       Social History     Socioeconomic History    Marital status:      Spouse name: Not on file    Number of children: Not on file    Years of education: Not on file    Highest education level: Not on file   Tobacco Use    Smoking status: Never Smoker    Smokeless tobacco: Never Used   Substance and Sexual Activity    Alcohol use:  Yes     Alcohol/week: 1.0 standard drinks     Types: 1 Glasses of wine per week    Drug use: Never     Social Determinants of Health     Financial Resource Strain:     Difficulty of Paying Living Expenses:    Food Insecurity:     Worried About Running Out of Food in the Last Year:     [address] in the Last Year:    Transportation Needs:     Lack of Transportation (Medical):  Lack of Transportation (Non-Medical):    Physical Activity:     Days of Exercise per Week:     Minutes of Exercise per Session:    Stress:     Feeling of Stress :    Social Connections:     Frequency of Communication with Friends and Family:     Frequency of Social Gatherings with Friends and Family:     Attends [employer] Services:     Active Member of Clubs or Organizations:     Attends Club or Organization Meetings:     Marital Status:       Past Surgical History:   Procedure Laterality Date    COLONOSCOPY N/A 12/29/2020    COLONOSCOPY performed by Dannie Palacios MD at [address] HX HYSTERECTOMY      HX ORTHOPAEDIC      left wrist; torn miniscus    HX OTHER SURGICAL      corrections of zenkers diverticulum      Past Medical History:   Diagnosis Date    Arthritis     GERD (gastroesophageal reflux disease)     Nausea & vomiting     with general andesthesia not con-sed    Zenker's diverticulum         I have reviewed and agree with [address] [place] [address] Nw and ROS and intake form in chart and the record furthermore I have reviewed prior medical record(s) regarding this patients care during this appointment. Review of Systems:   Patient is a pleasant appearing individual, appropriately dressed, well hydrated, well nourished, who is alert, appropriately oriented for age, and in no acute distress with a normal gait and normal affect who does not appear to be in any significant pain.      Physical Exam:  Left Knee -Decrease range of motion with flexion, Knee arc of greater than 50 degrees, Some crepitation, Grossly neurovascularly intact, Good cap refill, No skin lesion, Moderate swelling, No gross instability, Some quadriceps weakness, Kellgren and Bartolo at least grade 3    Right Knee - Full Range of Motion, No crepitation, Grossly neurovascularly intact, Good cap refill, No skin lesion, No swelling, No gross instability, No quadriceps weakness    Inpatient status: The patient has admitted to severe pain in the affected knee and due to such pain they are unable to complete activities of daily living at home and/or work on a regular basis where conservative treatments have failed. After extensive discussion with the patient, they have chosen to receive a total knee replacement with the expectation of inpatient procedure. Their dependent functional status (i.e. lack of capable support and safety at home, pain management, comorbities, or difficulty ambulating with assistive walking devices) would deem them a candidate for an inpatient stay. The patient acknowledges and understand the plan. The risks of surgery were explained to the patient which include but not limited to infection, nerve injury, artery injury, tendon injury, poor result, poor wound healing, unforeseen incidence, bleeding, infection, nerve damage, failure to improve, worsening of symptoms, morbidity, and mortality risks were explained. All questions were answered. Patient was told of no guarantees. Patient accepts all risks and benefits. A consent for surgery will be documented and signed by the patient or a legal guardian. All questions were answered. The procedure was explained in detail. The patient was counseled about the risks of charity Covid-19 during their perioperative period and any recovery window from their procedure. The patient was made aware that charity Covid-19 may worsen their prognosis for recovering from their procedure and lend to a higher morbidity and/or mortality risk. All material risks, benefits, and reasonable alternatives including postponing the procedure were discussed.  The patient DOES wish to proceed with their procedure at this time. Encounter Diagnoses     ICD-10-CM ICD-9-CM   1. Left knee pain, unspecified chronicity  M25.562 719.46       HPI:  The patient is here with a chief complaint of left knee pain. Failed conservative treatment. Continues to have difficulty and throbbing pain. Assessment/Plan:  Scheduled for left total knee replacement, outpatient surgery. Percocet, Keflex, Zofran, and aspirin for DVT prophylaxis. As part of continued conservative pain management options the patient was advised to utilize Tylenol or OTC NSAIDS as long as it is not medically contraindicated. Return to Office: Follow-up and Dispositions    · Return for already scheduled for surgery. Scribed by Christie Hughes LPN as dictated by [facility] [place] BEKA Galvan MD.  Documentation True and Accepted Nathen Galvan MD

## 2021-06-11 NOTE — PATIENT INSTRUCTIONS
Knee Pain or Injury: Care Instructions Your Care Instructions Injuries are a common cause of knee problems. Sudden (acute) injuries may be caused by a direct blow to the knee. They can also be caused by abnormal twisting, bending, or falling on the knee. Pain, bruising, or swelling may be severe, and may start within minutes of the injury. Overuse is another cause of knee pain. Other causes are climbing stairs, kneeling, and other activities that use the knee. Everyday wear and tear, especially as you get older, also can cause knee pain. Rest, along with home treatment, often relieves pain and allows your knee to heal. If you have a serious knee injury, you may need tests and treatment. Follow-up care is a key part of your treatment and safety. Be sure to make and go to all appointments, and call your doctor if you are having problems. It's also a good idea to know your test results and keep a list of the medicines you take. How can you care for yourself at home? · Be safe with medicines. Read and follow all instructions on the label. ? If the doctor gave you a prescription medicine for pain, take it as prescribed. ? If you are not taking a prescription pain medicine, ask your doctor if you can take an over-the-counter medicine. · Rest and protect your knee. Take a break from any activity that may cause pain. · Put ice or a cold pack on your knee for 10 to 20 minutes at a time. Put a thin cloth between the ice and your skin. · Prop up a sore knee on a pillow when you ice it or anytime you sit or lie down for the next 3 days. Try to keep it above the level of your heart. This will help reduce swelling. · If your knee is not swollen, you can put moist heat, a heating pad, or a warm cloth on your knee. · If your doctor recommends an elastic bandage, sleeve, or other type of support for your knee, wear it as directed.  
· Follow your doctor's instructions about how much weight you can put on your leg. Use a cane, crutches, or a walker as instructed. · Follow your doctor's instructions about activity during your healing process. If you can do mild exercise, slowly increase your activity. · Reach and stay at a healthy weight. Extra weight can strain the joints, especially the knees and hips, and make the pain worse. Losing even a few pounds may help. When should you call for help? Call 911 anytime you think you may need emergency care. For example, call if: 
  · You have symptoms of a blood clot in your lung (called a pulmonary embolism). These may include: 
? Sudden chest pain. ? Trouble breathing. ? Coughing up blood. Call your doctor now or seek immediate medical care if: 
  · You have severe or increasing pain.  
  · Your leg or foot turns cold or changes color.  
  · You cannot stand or put weight on your knee.  
  · Your knee looks twisted or bent out of shape.  
  · You cannot move your knee.  
  · You have signs of infection, such as: 
? Increased pain, swelling, warmth, or redness. ? Red streaks leading from the knee. ? Pus draining from a place on your knee. ? A fever.  
  · You have signs of a blood clot in your leg (called a deep vein thrombosis), such as: 
? Pain in your calf, back of the knee, thigh, or groin. ? Redness and swelling in your leg or groin. Watch closely for changes in your health, and be sure to contact your doctor if: 
  · You have tingling, weakness, or numbness in your knee.  
  · You have any new symptoms, such as swelling.  
  · You have bruises from a knee injury that last longer than 2 weeks.  
  · You do not get better as expected. Where can you learn more? Go to http://www.gray.com/ Enter K195 in the search box to learn more about \"Knee Pain or Injury: Care Instructions. \" Current as of: February 26, 2020               Content Version: 12.8 © 8274-8462 [employer]   
Care instructions adapted under license by [employer] (which disclaims liability or warranty for this information). If you have questions about a medical condition or this instruction, always ask your healthcare professional. [address] any warranty or liability for your use of this information.

## 2021-06-18 ENCOUNTER — OFFICE VISIT (OUTPATIENT)
Dept: ORTHOPEDIC SURGERY | Age: 61
End: 2021-06-18

## 2021-06-24 ENCOUNTER — VIRTUAL VISIT (OUTPATIENT)
Dept: ORTHOPEDIC SURGERY | Age: 61
End: 2021-06-24
Payer: COMMERCIAL

## 2021-06-24 DIAGNOSIS — M25.562 LEFT KNEE PAIN, UNSPECIFIED CHRONICITY: Primary | ICD-10-CM

## 2021-06-24 PROBLEM — Z96.652 TOTAL KNEE REPLACEMENT STATUS, LEFT: Status: ACTIVE | Noted: 2021-06-18

## 2021-06-24 PROCEDURE — 99024 POSTOP FOLLOW-UP VISIT: CPT | Performed by: ORTHOPAEDIC SURGERY

## 2021-06-24 RX ORDER — OXYCODONE AND ACETAMINOPHEN 5; 325 MG/1; MG/1
1 TABLET ORAL
Qty: 30 TABLET | Refills: 0 | Status: SHIPPED | OUTPATIENT
Start: 2021-06-24 | End: 2021-07-08

## 2021-06-24 NOTE — PROGRESS NOTES
Name: Mohinder Dong    : 1960     Service Dept: [facility]    Patient's Pharmacies:    [address] [place] [address] [id number] [place] [address]  [address] [id number]  Phone: [phone] Fax: [phone]       Chief Complaint   Patient presents with    Knee Pain        There were no vitals taken for this visit. Allergies   Allergen Reactions    Atorvastatin Other (comments)    Ciprofloxacin Other (comments)    Metronidazole Other (comments)    Sulfa (Sulfonamide Antibiotics) Rash    Trimethoprim Other (comments)      Current Outpatient Medications   Medication Sig Dispense Refill    oxyCODONE-acetaminophen (Percocet) 5-325 mg per tablet Take 1 Tablet by mouth every four to six (4-6) hours as needed for Pain for up to 14 days. Max Daily Amount: 6 Tablets. 30 Tablet 0    ondansetron (ZOFRAN ODT) 4 mg disintegrating tablet Take 1 Tablet by mouth every eight (8) hours as needed for Nausea or Nausea or Vomiting. 10 Tablet 0    amoxicillin-clavulanate (AUGMENTIN) 875-125 mg per tablet TAKE ONE TABLET BY MOUTH EVERY TWELVE HOURS      cyclobenzaprine (FLEXERIL) 10 mg tablet TAKE ONE TABLET BY MOUTH UP TO 2 TIMES EVERY DAY AS NEEDED FOR MUSCLE SPASMS.  diclofenac EC (VOLTAREN) 75 mg EC tablet TAKE ONE TABLET BY MOUTH TWICE A DAY WITH FOOD      citalopram (CeleXA) 40 mg tablet Take 40 mg by mouth daily.  pravastatin (PRAVACHOL) 20 mg tablet Take 20 mg by mouth nightly.  famotidine (Pepcid) 20 mg tablet Take 20 mg by mouth two (2) times a day.  LORazepam (Ativan) 1 mg tablet Take  by mouth every four (4) hours as needed for Anxiety.         Patient Active Problem List   Diagnosis Code    Total knee replacement status, left I42.213      Family History   Problem Relation Age of Onset    Cancer Mother     Diabetes Mother     Heart Disease Father     Cancer Brother     Heart Disease Brother       Social History Socioeconomic History    Marital status:      Spouse name: Not on file    Number of children: Not on file    Years of education: Not on file    Highest education level: Not on file   Tobacco Use    Smoking status: Never Smoker    Smokeless tobacco: Never Used   Substance and Sexual Activity    Alcohol use: Yes     Alcohol/week: 1.0 standard drinks     Types: 1 Glasses of wine per week    Drug use: Never     Social Determinants of Health     Financial Resource Strain:     Difficulty of Paying Living Expenses:    Food Insecurity:     Worried About Running Out of Food in the Last Year:     [address] in the Last Year:    Transportation Needs:     Lack of Transportation (Medical):  Lack of Transportation (Non-Medical):    Physical Activity:     Days of Exercise per Week:     Minutes of Exercise per Session:    Stress:     Feeling of Stress :    Social Connections:     Frequency of Communication with Friends and Family:     Frequency of Social Gatherings with Friends and Family:     Attends Buddhist Services:     Active Member of Clubs or Organizations:     Attends Club or Organization Meetings:     Marital Status:       Past Surgical History:   Procedure Laterality Date    COLONOSCOPY N/A 12/29/2020    COLONOSCOPY performed by Josefina Dupont MD at [address] HX HYSTERECTOMY      HX ORTHOPAEDIC      left wrist; torn miniscus    HX OTHER SURGICAL      [employer] [place] diverticulum      Past Medical History:   Diagnosis Date    Arthritis     GERD (gastroesophageal reflux disease)     Nausea & vomiting     with general andesthesia not con-sed    Zenker's diverticulum         I have reviewed and agree with [address] and MARYAM and intake form in chart and the record furthermore I have reviewed prior medical record(s) regarding this patients care during this appointment.      Review of Systems:   Patient is a pleasant appearing individual, appropriately dressed, well hydrated, well nourished, who is alert, appropriately oriented for age, and in no acute distress with a normal gait and normal affect who does not appear to be in any significant pain. Physical Exam:  Right Knee - Full Range of Motion, No crepitation, Grossly neurovascularly intact, Good cap refill, No skin lesion, No effusion, No gross instability, No point tenderness, No quadriceps weakness, No medial or lateral joint line tenderness, Negative Lachman's, Negative Maren's exam.   Encounter Diagnoses     ICD-10-CM ICD-9-CM   1. Left knee pain, unspecified chronicity  M25.562 719.46       HPI:  The patient is here status post left total knee replacement, doing well. Not using any assistive devices, but continues to have some difficulty with it. Assessment/Plan:  Plan at this point, my recommendation would be for left knee ultrasound, just because she is having some calf pain, otherwise no restrictions. We will call her with the results and go from there. If she gets worse, she is to give me a call. As part of continued conservative pain management options the patient was advised to utilize Tylenol or OTC NSAIDS as long as it is not medically contraindicated. Lavelle Boy, was evaluated through a synchronous (real-time) audio-video encounter. The patient (or guardian if applicable) is aware that this is a billable service. Verbal consent to proceed has been obtained within the past 12 months. The visit was conducted pursuant to the emergency declaration under the [facility], [address] [place] [address] authority and the [employer] Act. Patient identification was verified, and a caregiver was present when appropriate. The patient was located in a state where the provider was credentialed to provide care. Return to Office:    Follow-up and Dispositions    · Return in about 1 year (around 6/24/2022) for we will call with PVL results. Scribed by Ely Goldberg, LPN as dictated by [facility] BEKA Patton MD.  Documentation True and Accepted [facility] BEKA Patton MD

## 2021-06-24 NOTE — PATIENT INSTRUCTIONS
Knee Pain or Injury: Care Instructions  Your Care Instructions     Injuries are a common cause of knee problems. Sudden (acute) injuries may be caused by a direct blow to the knee. They can also be caused by abnormal twisting, bending, or falling on the knee. Pain, bruising, or swelling may be severe, and may start within minutes of the injury. Overuse is another cause of knee pain. Other causes are climbing stairs, kneeling, and other activities that use the knee. Everyday wear and tear, especially as you get older, also can cause knee pain. Rest, along with home treatment, often relieves pain and allows your knee to heal. If you have a serious knee injury, you may need tests and treatment. Follow-up care is a key part of your treatment and safety. Be sure to make and go to all appointments, and call your doctor if you are having problems. It's also a good idea to know your test results and keep a list of the medicines you take. How can you care for yourself at home? · Be safe with medicines. Read and follow all instructions on the label. ? If the doctor gave you a prescription medicine for pain, take it as prescribed. ? If you are not taking a prescription pain medicine, ask your doctor if you can take an over-the-counter medicine. · Rest and protect your knee. Take a break from any activity that may cause pain. · Put ice or a cold pack on your knee for 10 to 20 minutes at a time. Put a thin cloth between the ice and your skin. · Prop up a sore knee on a pillow when you ice it or anytime you sit or lie down for the next 3 days. Try to keep it above the level of your heart. This will help reduce swelling. · If your knee is not swollen, you can put moist heat, a heating pad, or a warm cloth on your knee. · If your doctor recommends an elastic bandage, sleeve, or other type of support for your knee, wear it as directed.   · Follow your doctor's instructions about how much weight you can put on your leg. Use a cane, crutches, or a walker as instructed. · Follow your doctor's instructions about activity during your healing process. If you can do mild exercise, slowly increase your activity. · Reach and stay at a healthy weight. Extra weight can strain the joints, especially the knees and hips, and make the pain worse. Losing even a few pounds may help. When should you call for help? Call 911 anytime you think you may need emergency care. For example, call if:    · You have symptoms of a blood clot in your lung (called a pulmonary embolism). These may include:  ? Sudden chest pain. ? Trouble breathing. ? Coughing up blood. Call your doctor now or seek immediate medical care if:    · You have severe or increasing pain.     · Your leg or foot turns cold or changes color.     · You cannot stand or put weight on your knee.     · Your knee looks twisted or bent out of shape.     · You cannot move your knee.     · You have signs of infection, such as:  ? Increased pain, swelling, warmth, or redness. ? Red streaks leading from the knee. ? Pus draining from a place on your knee. ? A fever.     · You have signs of a blood clot in your leg (called a deep vein thrombosis), such as:  ? Pain in your calf, back of the knee, thigh, or groin. ? Redness and swelling in your leg or groin. Watch closely for changes in your health, and be sure to contact your doctor if:    · You have tingling, weakness, or numbness in your knee.     · You have any new symptoms, such as swelling.     · You have bruises from a knee injury that last longer than 2 weeks.     · You do not get better as expected. Where can you learn more? Go to http://www.gray.com/  Enter K195 in the search box to learn more about \"Knee Pain or Injury: Care Instructions. \"  Current as of: February 26, 2020               Content Version: 12.8  © 2427-3973 [employer].    Care instructions adapted under license by [employer] (which disclaims liability or warranty for this information). If you have questions about a medical condition or this instruction, always ask your healthcare professional. [address] any warranty or liability for your use of this information.

## 2021-06-25 ENCOUNTER — HOSPITAL ENCOUNTER (OUTPATIENT)
Dept: VASCULAR SURGERY | Age: 61
Discharge: HOME | End: 2021-06-25
Payer: COMMERCIAL

## 2021-06-25 DIAGNOSIS — M25.562 LEFT KNEE PAIN, UNSPECIFIED CHRONICITY: ICD-10-CM

## 2021-06-25 PROCEDURE — 93971 EXTREMITY STUDY: CPT

## 2021-11-05 ENCOUNTER — TELEPHONE (OUTPATIENT)
Dept: ORTHOPEDIC SURGERY | Age: 61
End: 2021-11-05

## 2021-11-05 NOTE — TELEPHONE ENCOUNTER
PATIENT HAD TKA IN June AND IS SCHEDULED TO SEE DENTIST ON 11TH. NEEDS ANTIBIOTIC SCRIPT SENT TO [facility].

## 2021-11-08 RX ORDER — CEPHALEXIN 500 MG/1
500 CAPSULE ORAL
Qty: 4 CAPSULE | Refills: 0 | Status: SHIPPED | OUTPATIENT
Start: 2021-11-08 | End: 2021-11-08

## 2022-03-07 ENCOUNTER — TRANSCRIBE ORDER (OUTPATIENT)
Dept: REGISTRATION | Age: 62
End: 2022-03-07

## 2022-03-07 ENCOUNTER — HOSPITAL ENCOUNTER (OUTPATIENT)
Dept: LAB | Age: 62
Discharge: HOME | End: 2022-03-07
Payer: COMMERCIAL

## 2022-03-07 ENCOUNTER — HOSPITAL ENCOUNTER (OUTPATIENT)
Dept: NON INVASIVE DIAGNOSTICS | Age: 62
Discharge: HOME | End: 2022-03-07
Payer: COMMERCIAL

## 2022-03-07 DIAGNOSIS — M25.561 RIGHT KNEE PAIN: ICD-10-CM

## 2022-03-07 DIAGNOSIS — M25.561 RIGHT KNEE PAIN: Primary | ICD-10-CM

## 2022-03-07 LAB
ALBUMIN SERPL-MCNC: 4 G/DL (ref 3.4–5)
ALBUMIN/GLOB SERPL: 1.3 {RATIO} (ref 0.8–1.7)
ALP SERPL-CCNC: 70 U/L (ref 45–117)
ALT SERPL-CCNC: 22 U/L (ref 13–56)
ANION GAP SERPL CALC-SCNC: 3 MMOL/L (ref 3–18)
APPEARANCE UR: CLEAR
APTT PPP: 31.8 SEC (ref 23–36.4)
AST SERPL-CCNC: 14 U/L (ref 10–38)
BACTERIA URNS QL MICRO: ABNORMAL /HPF
BILIRUB SERPL-MCNC: 0.5 MG/DL (ref 0.2–1)
BILIRUB UR QL: NEGATIVE
BUN SERPL-MCNC: 17 MG/DL (ref 7–18)
BUN/CREAT SERPL: 20 (ref 12–20)
CALCIUM SERPL-MCNC: 9.5 MG/DL (ref 8.5–10.1)
CHLORIDE SERPL-SCNC: 110 MMOL/L (ref 100–111)
CO2 SERPL-SCNC: 29 MMOL/L (ref 21–32)
COLOR UR: YELLOW
CREAT SERPL-MCNC: 0.83 MG/DL (ref 0.6–1.3)
EPITH CASTS URNS QL MICRO: ABNORMAL /LPF (ref 0–5)
ERYTHROCYTE [DISTWIDTH] IN BLOOD BY AUTOMATED COUNT: 13.3 % (ref 11.6–14.5)
ERYTHROCYTE [SEDIMENTATION RATE] IN BLOOD: 4 MM/HR (ref 0–30)
GLOBULIN SER CALC-MCNC: 3.1 G/DL (ref 2–4)
GLUCOSE SERPL-MCNC: 96 MG/DL (ref 74–99)
GLUCOSE UR STRIP.AUTO-MCNC: NEGATIVE MG/DL
HCT VFR BLD AUTO: 42.8 % (ref 35–45)
HGB BLD-MCNC: 13.6 G/DL (ref 12–16)
HGB UR QL STRIP: ABNORMAL
INR PPP: 1 (ref 0.8–1.2)
KETONES UR QL STRIP.AUTO: NEGATIVE MG/DL
LEUKOCYTE ESTERASE UR QL STRIP.AUTO: ABNORMAL
MCH RBC QN AUTO: 29.8 PG (ref 24–34)
MCHC RBC AUTO-ENTMCNC: 31.8 G/DL (ref 31–37)
MCV RBC AUTO: 93.7 FL (ref 78–100)
MUCOUS THREADS URNS QL MICRO: POSITIVE /LPF
NITRITE UR QL STRIP.AUTO: NEGATIVE
NRBC # BLD: 0 K/UL (ref 0–0.01)
NRBC BLD-RTO: 0 PER 100 WBC
PH UR STRIP: 5 [PH] (ref 5–8)
PLATELET # BLD AUTO: 280 K/UL (ref 135–420)
PMV BLD AUTO: 9.9 FL (ref 9.2–11.8)
POTASSIUM SERPL-SCNC: 4.3 MMOL/L (ref 3.5–5.5)
PROT SERPL-MCNC: 7.1 G/DL (ref 6.4–8.2)
PROT UR STRIP-MCNC: NEGATIVE MG/DL
PROTHROMBIN TIME: 12.3 SEC (ref 11.5–15.2)
RBC # BLD AUTO: 4.57 M/UL (ref 4.2–5.3)
RBC #/AREA URNS HPF: ABNORMAL /HPF (ref 0–5)
SODIUM SERPL-SCNC: 142 MMOL/L (ref 136–145)
SP GR UR REFRACTOMETRY: 1.02 (ref 1–1.03)
UROBILINOGEN UR QL STRIP.AUTO: 1 EU/DL (ref 0.2–1)
WBC # BLD AUTO: 5.4 K/UL (ref 4.6–13.2)
WBC URNS QL MICRO: ABNORMAL /HPF (ref 0–5)

## 2022-03-07 PROCEDURE — 81001 URINALYSIS AUTO W/SCOPE: CPT

## 2022-03-07 PROCEDURE — 93005 ELECTROCARDIOGRAM TRACING: CPT

## 2022-03-07 PROCEDURE — 85610 PROTHROMBIN TIME: CPT

## 2022-03-07 PROCEDURE — 85027 COMPLETE CBC AUTOMATED: CPT

## 2022-03-07 PROCEDURE — 80053 COMPREHEN METABOLIC PANEL: CPT

## 2022-03-07 PROCEDURE — 87086 URINE CULTURE/COLONY COUNT: CPT

## 2022-03-07 PROCEDURE — 36415 COLL VENOUS BLD VENIPUNCTURE: CPT

## 2022-03-07 PROCEDURE — 85652 RBC SED RATE AUTOMATED: CPT

## 2022-03-07 PROCEDURE — 85730 THROMBOPLASTIN TIME PARTIAL: CPT

## 2022-03-08 LAB
ATRIAL RATE: 70 BPM
BACTERIA SPEC CULT: ABNORMAL
BACTERIA SPEC CULT: NORMAL
BACTERIA SPEC CULT: NORMAL
CALCULATED P AXIS, ECG09: 70 DEGREES
CALCULATED R AXIS, ECG10: 68 DEGREES
CALCULATED T AXIS, ECG11: 93 DEGREES
CC UR VC: ABNORMAL
DIAGNOSIS, 93000: NORMAL
P-R INTERVAL, ECG05: 148 MS
Q-T INTERVAL, ECG07: 418 MS
QRS DURATION, ECG06: 84 MS
QTC CALCULATION (BEZET), ECG08: 451 MS
SERVICE CMNT-IMP: ABNORMAL
SERVICE CMNT-IMP: NORMAL
VENTRICULAR RATE, ECG03: 70 BPM

## 2022-03-19 PROBLEM — Z96.652 TOTAL KNEE REPLACEMENT STATUS, LEFT: Status: ACTIVE | Noted: 2021-06-18

## 2022-10-05 ENCOUNTER — TRANSCRIBE ORDER (OUTPATIENT)
Dept: SCHEDULING | Age: 62
End: 2022-10-05

## 2022-10-05 DIAGNOSIS — M25.561 KNEE PAIN, RIGHT: Primary | ICD-10-CM

## 2022-10-06 ENCOUNTER — TRANSCRIBE ORDER (OUTPATIENT)
Dept: SCHEDULING | Age: 62
End: 2022-10-06

## 2022-10-06 DIAGNOSIS — M25.561 PAIN IN RIGHT KNEE: Primary | ICD-10-CM

## 2022-10-24 ENCOUNTER — HOSPITAL ENCOUNTER (OUTPATIENT)
Dept: CT IMAGING | Age: 62
Discharge: HOME | End: 2022-10-24
Attending: ORTHOPAEDIC SURGERY
Payer: COMMERCIAL

## 2022-10-24 DIAGNOSIS — M25.561 PAIN IN RIGHT KNEE: ICD-10-CM

## 2022-10-24 PROCEDURE — 73700 CT LOWER EXTREMITY W/O DYE: CPT

## 2023-12-04 ENCOUNTER — HOSPITAL ENCOUNTER (OUTPATIENT)
Facility: HOSPITAL | Age: 63
Discharge: HOME | End: 2023-12-06
Payer: COMMERCIAL

## 2023-12-04 ENCOUNTER — HOSPITAL ENCOUNTER (OUTPATIENT)
Facility: HOSPITAL | Age: 63
Discharge: HOME | End: 2023-12-07
Payer: COMMERCIAL

## 2023-12-04 DIAGNOSIS — M75.21 BICEPS TENDINITIS ON RIGHT: ICD-10-CM

## 2023-12-04 DIAGNOSIS — M19.011 OSTEOARTHRITIS OF RIGHT SHOULDER, UNSPECIFIED OSTEOARTHRITIS TYPE: ICD-10-CM

## 2023-12-04 DIAGNOSIS — M25.511 RIGHT SHOULDER PAIN, UNSPECIFIED CHRONICITY: ICD-10-CM

## 2023-12-04 DIAGNOSIS — M75.101 ROTATOR CUFF SYNDROME OF RIGHT SHOULDER: ICD-10-CM

## 2023-12-04 DIAGNOSIS — M75.41 IMPINGEMENT SYNDROME OF RIGHT SHOULDER: ICD-10-CM

## 2023-12-04 LAB
ALBUMIN SERPL-MCNC: 4 G/DL (ref 3.4–5)
ALBUMIN/GLOB SERPL: 1.3 (ref 0.8–1.7)
ALP SERPL-CCNC: 82 U/L (ref 45–117)
ALT SERPL-CCNC: 24 U/L (ref 13–56)
ANION GAP SERPL CALC-SCNC: 8 MMOL/L (ref 3–18)
APPEARANCE UR: CLEAR
AST SERPL-CCNC: 18 U/L (ref 10–38)
BACTERIA URNS QL MICRO: ABNORMAL /HPF
BASOPHILS # BLD: 0 K/UL (ref 0–0.1)
BASOPHILS NFR BLD: 0 % (ref 0–2)
BILIRUB SERPL-MCNC: 0.6 MG/DL (ref 0.2–1)
BILIRUB UR QL: NEGATIVE
BUN SERPL-MCNC: 17 MG/DL (ref 7–18)
BUN/CREAT SERPL: 18 (ref 12–20)
CALCIUM SERPL-MCNC: 9.6 MG/DL (ref 8.5–10.1)
CHLORIDE SERPL-SCNC: 109 MMOL/L (ref 100–111)
CO2 SERPL-SCNC: 26 MMOL/L (ref 21–32)
COLOR UR: YELLOW
CREAT SERPL-MCNC: 0.92 MG/DL (ref 0.6–1.3)
DIFFERENTIAL METHOD BLD: NORMAL
EKG ATRIAL RATE: 84 BPM
EKG DIAGNOSIS: NORMAL
EKG P AXIS: 62 DEGREES
EKG P-R INTERVAL: 142 MS
EKG Q-T INTERVAL: 388 MS
EKG QRS DURATION: 84 MS
EKG QTC CALCULATION (BAZETT): 458 MS
EKG R AXIS: 33 DEGREES
EKG T AXIS: 260 DEGREES
EKG VENTRICULAR RATE: 84 BPM
EOSINOPHIL # BLD: 0.1 K/UL (ref 0–0.4)
EOSINOPHIL NFR BLD: 2 % (ref 0–5)
EPITH CASTS URNS QL MICRO: ABNORMAL /LPF (ref 0–5)
ERYTHROCYTE [DISTWIDTH] IN BLOOD BY AUTOMATED COUNT: 13.2 % (ref 11.6–14.5)
GLOBULIN SER CALC-MCNC: 3.1 G/DL (ref 2–4)
GLUCOSE SERPL-MCNC: 100 MG/DL (ref 74–99)
GLUCOSE UR STRIP.AUTO-MCNC: NEGATIVE MG/DL
HCT VFR BLD AUTO: 42.3 % (ref 35–45)
HGB BLD-MCNC: 14.1 G/DL (ref 12–16)
HGB UR QL STRIP: ABNORMAL
IMM GRANULOCYTES # BLD AUTO: 0 K/UL (ref 0–0.04)
IMM GRANULOCYTES NFR BLD AUTO: 0 % (ref 0–0.5)
KETONES UR QL STRIP.AUTO: NEGATIVE MG/DL
LEUKOCYTE ESTERASE UR QL STRIP.AUTO: ABNORMAL
LYMPHOCYTES # BLD: 1.7 K/UL (ref 0.9–3.6)
LYMPHOCYTES NFR BLD: 30 % (ref 21–52)
MCH RBC QN AUTO: 30.1 PG (ref 24–34)
MCHC RBC AUTO-ENTMCNC: 33.3 G/DL (ref 31–37)
MCV RBC AUTO: 90.4 FL (ref 78–100)
MONOCYTES # BLD: 0.5 K/UL (ref 0.05–1.2)
MONOCYTES NFR BLD: 9 % (ref 3–10)
NEUTS SEG # BLD: 3.3 K/UL (ref 1.8–8)
NEUTS SEG NFR BLD: 59 % (ref 40–73)
NITRITE UR QL STRIP.AUTO: NEGATIVE
NRBC # BLD: 0 K/UL (ref 0–0.01)
NRBC BLD-RTO: 0 PER 100 WBC
PH UR STRIP: 5.5 (ref 5–8)
PLATELET # BLD AUTO: 308 K/UL (ref 135–420)
PMV BLD AUTO: 9.5 FL (ref 9.2–11.8)
POTASSIUM SERPL-SCNC: 4.2 MMOL/L (ref 3.5–5.5)
PROT SERPL-MCNC: 7.1 G/DL (ref 6.4–8.2)
PROT UR STRIP-MCNC: NEGATIVE MG/DL
RBC # BLD AUTO: 4.68 M/UL (ref 4.2–5.3)
RBC #/AREA URNS HPF: ABNORMAL /HPF (ref 0–5)
SODIUM SERPL-SCNC: 143 MMOL/L (ref 136–145)
SP GR UR REFRACTOMETRY: 1.02 (ref 1–1.03)
UROBILINOGEN UR QL STRIP.AUTO: 0.2 EU/DL (ref 0.2–1)
WBC # BLD AUTO: 5.6 K/UL (ref 4.6–13.2)
WBC URNS QL MICRO: ABNORMAL /HPF (ref 0–5)

## 2023-12-04 PROCEDURE — 36415 COLL VENOUS BLD VENIPUNCTURE: CPT

## 2023-12-04 PROCEDURE — 85025 COMPLETE CBC W/AUTO DIFF WBC: CPT

## 2023-12-04 PROCEDURE — 93005 ELECTROCARDIOGRAM TRACING: CPT

## 2023-12-04 PROCEDURE — 80053 COMPREHEN METABOLIC PANEL: CPT

## 2023-12-04 PROCEDURE — 81001 URINALYSIS AUTO W/SCOPE: CPT

## 2025-03-08 NOTE — PATIENT INSTRUCTIONS
Discharge Summary      ADMIT DATE: 3/7/25    DISCHARGE DATE: 3/8/25    SURGEON: Dr. Verduzco    REASON FOR ADMISSION: R TKA    Current Vitals:  Temperature Blood Pressure Heart Rate Resp Rate SpO2   Temp: 97.5 °F (36.4 °C) BP: (!) 146/82 Heart Rate: 67 Resp: 20  SpO2: 93 %      Physical Exam:  General appearance:   Awake, alert, cooperative, no distress     Musculoskeletal:       RLE:  Grossly NVI   +EHL/FHL/GC/TA intact  SILT L2-S1   +2/4 distal pulses with capillary refill <2 sec   Foot WWP  Compartments Soft; no calf TTP  Dressings clean, dry, and intact     Labs:         Lab Results   Component Value Date     HGB 11.7 (L) 02/25/2025     HGB 13.4 11/26/2024     HGB 12.5 06/17/2020     HGB 12.2 09/25/2019     WBC 6.4 02/25/2025     WBC 7.3 11/26/2024     WBC 5.8 06/17/2020     WBC 6.1 09/25/2019      CONDITION OF PT on DISCHARGE: good    DISCHARGE INSTRUCTIONS: provided    PENDING RESULTS: n/a    Juanjose De Leon DO PGY-5  Orthopedic Surgery  12:01 PM 03/08/25    Please contact the orthopedic surgery resident on call via [employer] with any questions or concerns     Knee Pain or Injury: Care Instructions Your Care Instructions Injuries are a common cause of knee problems. Sudden (acute) injuries may be caused by a direct blow to the knee. They can also be caused by abnormal twisting, bending, or falling on the knee. Pain, bruising, or swelling may be severe, and may start within minutes of the injury. Overuse is another cause of knee pain. Other causes are climbing stairs, kneeling, and other activities that use the knee. Everyday wear and tear, especially as you get older, also can cause knee pain. Rest, along with home treatment, often relieves pain and allows your knee to heal. If you have a serious knee injury, you may need tests and treatment. Follow-up care is a key part of your treatment and safety. Be sure to make and go to all appointments, and call your doctor if you are having problems. It's also a good idea to know your test results and keep a list of the medicines you take. How can you care for yourself at home? · Be safe with medicines. Read and follow all instructions on the label. ? If the doctor gave you a prescription medicine for pain, take it as prescribed. ? If you are not taking a prescription pain medicine, ask your doctor if you can take an over-the-counter medicine. · Rest and protect your knee. Take a break from any activity that may cause pain. · Put ice or a cold pack on your knee for 10 to 20 minutes at a time. Put a thin cloth between the ice and your skin. · Prop up a sore knee on a pillow when you ice it or anytime you sit or lie down for the next 3 days. Try to keep it above the level of your heart. This will help reduce swelling. · If your knee is not swollen, you can put moist heat, a heating pad, or a warm cloth on your knee. · If your doctor recommends an elastic bandage, sleeve, or other type of support for your knee, wear it as directed.  
· Follow your doctor's instructions about how much weight you can put on your leg. Use a cane, crutches, or a walker as instructed. · Follow your doctor's instructions about activity during your healing process. If you can do mild exercise, slowly increase your activity. · Reach and stay at a healthy weight. Extra weight can strain the joints, especially the knees and hips, and make the pain worse. Losing even a few pounds may help. When should you call for help? Call 911 anytime you think you may need emergency care. For example, call if: 
  · You have symptoms of a blood clot in your lung (called a pulmonary embolism). These may include: 
? Sudden chest pain. ? Trouble breathing. ? Coughing up blood. Call your doctor now or seek immediate medical care if: 
  · You have severe or increasing pain.  
  · Your leg or foot turns cold or changes color.  
  · You cannot stand or put weight on your knee.  
  · Your knee looks twisted or bent out of shape.  
  · You cannot move your knee.  
  · You have signs of infection, such as: 
? Increased pain, swelling, warmth, or redness. ? Red streaks leading from the knee. ? Pus draining from a place on your knee. ? A fever.  
  · You have signs of a blood clot in your leg (called a deep vein thrombosis), such as: 
? Pain in your calf, back of the knee, thigh, or groin. ? Redness and swelling in your leg or groin. Watch closely for changes in your health, and be sure to contact your doctor if: 
  · You have tingling, weakness, or numbness in your knee.  
  · You have any new symptoms, such as swelling.  
  · You have bruises from a knee injury that last longer than 2 weeks.  
  · You do not get better as expected. Where can you learn more? Go to http://www.gray.com/ Enter K195 in the search box to learn more about \"Knee Pain or Injury: Care Instructions. \" Current as of: February 26, 2020               Content Version: 12.8 © 2272-5951 [employer].   
Care instructions adapted under license by [employer] (which disclaims liability or warranty for this information). If you have questions about a medical condition or this instruction, always ask your healthcare professional. [address] any warranty or liability for your use of this information.

## (undated) DEVICE — WRISTBAND ID AD W2.5XL9.5CM RED VYN ADH CLSR UNI-PRINT

## (undated) DEVICE — SYR 5ML 1/5 GRAD LL NSAF LF --

## (undated) DEVICE — NDL PRT INJ NSAF BLNT 18GX1.5 --

## (undated) DEVICE — SINGLE PORT MANIFOLD: Brand: NEPTUNE 2

## (undated) DEVICE — GARMENT,MEDLINE,DVT,INT,CALF,MED, GEN2: Brand: MEDLINE

## (undated) DEVICE — TRAP SPEC COLL POLYP POLYSTYR --

## (undated) DEVICE — SPONGE GZ W4XL4IN COT 12 PLY TYP VII WVN C FLD DSGN

## (undated) DEVICE — CATH IV SAFE STR 22GX1IN BLU -- PROTECTIV PLUS

## (undated) DEVICE — CANNULA CUSH AD W/ 14FT TBG

## (undated) DEVICE — SPONGE GZ W4XL4IN RAYON POLY 4 PLY NONWOVEN FASTER WICKING

## (undated) DEVICE — SYR 3ML LL TIP 1/10ML GRAD --

## (undated) DEVICE — TRNQT TEXT 1X18IN BLU LF DISP -- CONVERT TO ITEM 362165

## (undated) DEVICE — SET ADMIN 16ML TBNG L100IN 2 Y INJ SITE IV PIGGY BK DISP

## (undated) DEVICE — MAJ-1414 SINGLE USE ADPATER BIOPSY VALV: Brand: SINGLE USE ADAPTOR BIOPSY VALVE

## (undated) DEVICE — TUBING, SUCTION, 1/4" X 12', STRAIGHT: Brand: MEDLINE

## (undated) DEVICE — NDL FLTR TIP 5 MIC 18GX1.5IN --

## (undated) DEVICE — CATH SUC CTRL PRT TRIFLO 14FR --

## (undated) DEVICE — SOLUTION IV 500ML 0.9% SOD CHL FLX CONT

## (undated) DEVICE — Device

## (undated) DEVICE — SYRINGE 50ML E/T

## (undated) DEVICE — KENDALL RADIOLUCENT FOAM MONITORING ELECTRODE RECTANGULAR SHAPE: Brand: KENDALL